# Patient Record
Sex: MALE | Race: WHITE
[De-identification: names, ages, dates, MRNs, and addresses within clinical notes are randomized per-mention and may not be internally consistent; named-entity substitution may affect disease eponyms.]

---

## 2019-02-04 ENCOUNTER — HOSPITAL ENCOUNTER (EMERGENCY)
Dept: HOSPITAL 76 - ED | Age: 29
Discharge: HOME | End: 2019-02-04
Payer: COMMERCIAL

## 2019-02-04 VITALS — DIASTOLIC BLOOD PRESSURE: 88 MMHG | SYSTOLIC BLOOD PRESSURE: 137 MMHG

## 2019-02-04 DIAGNOSIS — H10.33: Primary | ICD-10-CM

## 2019-02-04 DIAGNOSIS — J34.89: ICD-10-CM

## 2019-02-04 PROCEDURE — 99283 EMERGENCY DEPT VISIT LOW MDM: CPT

## 2019-02-04 NOTE — ED PHYSICIAN DOCUMENTATION
History of Present Illness





- Stated complaint


Stated Complaint: EYE PX





- Chief complaint


Chief Complaint: Heent





- History obtained from


History obtained from: Patient





- History of Present Illness


Timing: Yesterday


Pain level max: 0


Pain level now: 0





- Additonal information


Additional information: 





Patient has had viral URI symptoms recently.  Today noticed redness to the 

bilateral eyes with burning and itching. Clear drainage.  Nothing makes it 

better or worse.  No fevers.  Does have rhinorrhea and congestion





Review of Systems


Constitutional: denies: Fever, Chills


GI: denies: Vomiting


Skin: denies: Rash


Musculoskeletal: denies: Neck pain, Back pain


Neurologic: denies: Headache





PD PAST MEDICAL HISTORY





- Past Medical History


Past Medical History: No


Cardiovascular: None


Respiratory: None


Neuro: None


Endocrine/Autoimmune: None


GI: None


: None


HEENT: None


Psych: None


Musculoskeletal: None


Derm: None





- Past Surgical History


Past Surgical History: No





- Present Medications


Home Medications: 


                                Ambulatory Orders











 Medication  Instructions  Recorded  Confirmed


 


Ketotifen Fumarate [Zaditor] 1 drops EACHEYE Q8H PRN #1 bottle 02/04/19 


 


Polymyxin B/Trimeth Ophth Drop 1 drops EACHEYE Q3H 7 Days #1 02/04/19 





[Polytrim Ophth Drops] bottle  














- Allergies


Allergies/Adverse Reactions: 


                                    Allergies











Allergy/AdvReac Type Severity Reaction Status Date / Time


 


No Known Drug Allergies Allergy   Verified 02/04/19 14:56














- Social History


Does the pt smoke?: No


Smoking Status: Never smoker


Does the pt drink ETOH?: No


Does the pt have substance abuse?: No





- Immunizations


Immunizations are current?: Yes





- POLST


Patient has POLST: No





PD ED PE NORMAL





- Vitals


Vital signs reviewed: Yes





- General


General: Alert and oriented X 3, No acute distress





- HEENT


HEENT: Ears normal, Moist mucous membranes, Pharynx benign, Other (Bilateral 

injected conjunctiva.  Clear drainage.  Normal lids.)





- Neck


Neck: Supple, no meningeal sign





- Cardiac


Cardiac: RRR, Strong equal pulses





- Respiratory


Respiratory: No respiratory distress, Clear bilaterally





- Derm


Derm: Warm and dry





- Neuro


Neuro: Alert and oriented X 3





Results





- Vitals


Vitals: 


                               Vital Signs - 24 hr











  02/04/19





  14:53


 


Temperature 36.4 C L


 


Heart Rate 85


 


Respiratory 14





Rate 


 


Blood Pressure 137/88 H


 


O2 Saturation 99








                                     Oxygen











O2 Source                      Room air

















PD MEDICAL DECISION MAKING





- ED course


Complexity details: considered differential, d/w patient


ED course: 





Patient is a 28-year-old male who presents with bilateral conjunctivitis, 

unclear if this is viral or bacterial.  Will place on Polytrim ophthalmic and 

ketotifen drops for home.  Patient is well-appearing, nontoxic.  Patient 

counseled regarding signs and symptoms for which I believe and urgent re-

evaluation would be necessary. Patient with good understanding of and agreement 

to plan and is comfortable going home at this time





This document was made in part using voice recognition software. While efforts 

are made to proofread this document, sound alike and grammatical errors may 

occur.





Departure





- Departure


Disposition: 01 Home, Self Care


Clinical Impression: 


Conjunctivitis


Qualifiers:


 Conjunctivitis type: acute Acute conjunctivitis type: unspecified Laterality: 

bilateral Qualified Code(s): H10.33 - Unspecified acute conjunctivitis, 

bilateral





Condition: Good


Instructions:  ED Conjunctivitis Nonspecific


Follow-Up: 


your,doctor in 1 week [Other]


Prescriptions: 


Ketotifen Fumarate [Zaditor] 1 drops EACHEYE Q8H PRN #1 bottle


 PRN Reason: itching


Polymyxin B/Trimeth Ophth Drop [Polytrim Ophth Drops] 1 drops EACHEYE Q3H 7 Days

#1 bottle


Comments: 


Use the medications as prescribed.  Return if you worsen.  Follow-up with your 

doctor for further care.


Discharge Date/Time: 02/04/19 16:16